# Patient Record
Sex: FEMALE | Race: WHITE | Employment: FULL TIME | URBAN - NONMETROPOLITAN AREA
[De-identification: names, ages, dates, MRNs, and addresses within clinical notes are randomized per-mention and may not be internally consistent; named-entity substitution may affect disease eponyms.]

---

## 2024-04-06 ENCOUNTER — HOSPITAL ENCOUNTER (EMERGENCY)
Age: 39
Discharge: HOME | End: 2024-04-06
Attending: EMERGENCY MEDICINE
Payer: COMMERCIAL

## 2024-04-06 VITALS
TEMPERATURE: 98.6 F | SYSTOLIC BLOOD PRESSURE: 135 MMHG | OXYGEN SATURATION: 100 % | BODY MASS INDEX: 30.43 KG/M2 | DIASTOLIC BLOOD PRESSURE: 74 MMHG | WEIGHT: 212.6 LBS | HEART RATE: 72 BPM | RESPIRATION RATE: 16 BRPM | HEIGHT: 70 IN

## 2024-04-06 DIAGNOSIS — R11.2 NAUSEA AND VOMITING, UNSPECIFIED VOMITING TYPE: Primary | ICD-10-CM

## 2024-04-06 LAB
ALBUMIN SERPL-MCNC: 4.4 G/DL (ref 3.4–5)
ALBUMIN/GLOB SERPL: 1.4 {RATIO} (ref 1.1–2.2)
ALP SERPL-CCNC: 100 U/L (ref 40–129)
ALT SERPL-CCNC: 18 U/L (ref 10–40)
ANION GAP SERPL CALCULATED.3IONS-SCNC: 13 MMOL/L (ref 3–16)
AST SERPL-CCNC: 24 U/L (ref 15–37)
BASOPHILS # BLD: 0 K/UL (ref 0–0.2)
BASOPHILS NFR BLD: 0.1 %
BILIRUB SERPL-MCNC: 0.7 MG/DL (ref 0–1)
BILIRUB UR QL STRIP.AUTO: NEGATIVE
BUN SERPL-MCNC: 16 MG/DL (ref 7–20)
CALCIUM SERPL-MCNC: 9.7 MG/DL (ref 8.3–10.6)
CHLORIDE SERPL-SCNC: 105 MMOL/L (ref 99–110)
CLARITY UR: CLEAR
CO2 SERPL-SCNC: 23 MMOL/L (ref 21–32)
COLOR UR: YELLOW
CREAT SERPL-MCNC: 0.6 MG/DL (ref 0.6–1.1)
DEPRECATED RDW RBC AUTO: 13 % (ref 12.4–15.4)
EOSINOPHIL # BLD: 0 K/UL (ref 0–0.6)
EOSINOPHIL NFR BLD: 0 %
EPI CELLS #/AREA URNS HPF: ABNORMAL /HPF (ref 0–5)
GFR SERPLBLD CREATININE-BSD FMLA CKD-EPI: >90 ML/MIN/{1.73_M2}
GLUCOSE SERPL-MCNC: 123 MG/DL (ref 70–99)
GLUCOSE UR STRIP.AUTO-MCNC: NEGATIVE MG/DL
HCT VFR BLD AUTO: 42.3 % (ref 36–48)
HGB BLD-MCNC: 14.4 G/DL (ref 12–16)
HGB UR QL STRIP.AUTO: NEGATIVE
KETONES UR STRIP.AUTO-MCNC: 40 MG/DL
LEUKOCYTE ESTERASE UR QL STRIP.AUTO: NEGATIVE
LIPASE SERPL-CCNC: 28 U/L (ref 13–60)
LYMPHOCYTES # BLD: 0.4 K/UL (ref 1–5.1)
LYMPHOCYTES NFR BLD: 4.2 %
MCH RBC QN AUTO: 29.1 PG (ref 26–34)
MCHC RBC AUTO-ENTMCNC: 34 G/DL (ref 31–36)
MCV RBC AUTO: 85.7 FL (ref 80–100)
MONOCYTES # BLD: 0.2 K/UL (ref 0–1.3)
MONOCYTES NFR BLD: 2.7 %
MUCOUS THREADS #/AREA URNS LPF: ABNORMAL /LPF
NEUTROPHILS # BLD: 8.4 K/UL (ref 1.7–7.7)
NEUTROPHILS NFR BLD: 93 %
NITRITE UR QL STRIP.AUTO: NEGATIVE
PH UR STRIP.AUTO: 7.5 [PH] (ref 5–8)
PLATELET # BLD AUTO: 230 K/UL (ref 135–450)
PMV BLD AUTO: 8.2 FL (ref 5–10.5)
POTASSIUM SERPL-SCNC: 4.5 MMOL/L (ref 3.5–5.1)
PROT SERPL-MCNC: 7.6 G/DL (ref 6.4–8.2)
PROT UR STRIP.AUTO-MCNC: ABNORMAL MG/DL
RBC # BLD AUTO: 4.94 M/UL (ref 4–5.2)
RBC #/AREA URNS HPF: ABNORMAL /HPF (ref 0–4)
SODIUM SERPL-SCNC: 141 MMOL/L (ref 136–145)
SP GR UR STRIP.AUTO: 1.02 (ref 1–1.03)
UA COMPLETE W REFLEX CULTURE PNL UR: ABNORMAL
UA DIPSTICK W REFLEX MICRO PNL UR: YES
URN SPEC COLLECT METH UR: ABNORMAL
UROBILINOGEN UR STRIP-ACNC: 0.2 E.U./DL
WBC # BLD AUTO: 9.1 K/UL (ref 4–11)
WBC #/AREA URNS HPF: ABNORMAL /HPF (ref 0–5)

## 2024-04-06 PROCEDURE — 99284 EMERGENCY DEPT VISIT MOD MDM: CPT

## 2024-04-06 PROCEDURE — 2580000003 HC RX 258: Performed by: EMERGENCY MEDICINE

## 2024-04-06 PROCEDURE — 81001 URINALYSIS AUTO W/SCOPE: CPT

## 2024-04-06 PROCEDURE — 83690 ASSAY OF LIPASE: CPT

## 2024-04-06 PROCEDURE — 36415 COLL VENOUS BLD VENIPUNCTURE: CPT

## 2024-04-06 PROCEDURE — 96375 TX/PRO/DX INJ NEW DRUG ADDON: CPT

## 2024-04-06 PROCEDURE — 2500000003 HC RX 250 WO HCPCS: Performed by: EMERGENCY MEDICINE

## 2024-04-06 PROCEDURE — 96374 THER/PROPH/DIAG INJ IV PUSH: CPT

## 2024-04-06 PROCEDURE — 80053 COMPREHEN METABOLIC PANEL: CPT

## 2024-04-06 PROCEDURE — 85025 COMPLETE CBC W/AUTO DIFF WBC: CPT

## 2024-04-06 PROCEDURE — 6360000002 HC RX W HCPCS: Performed by: EMERGENCY MEDICINE

## 2024-04-06 RX ORDER — 0.9 % SODIUM CHLORIDE 0.9 %
1000 INTRAVENOUS SOLUTION INTRAVENOUS ONCE
Status: COMPLETED | OUTPATIENT
Start: 2024-04-06 | End: 2024-04-06

## 2024-04-06 RX ORDER — ONDANSETRON 2 MG/ML
4 INJECTION INTRAMUSCULAR; INTRAVENOUS ONCE
Status: COMPLETED | OUTPATIENT
Start: 2024-04-06 | End: 2024-04-06

## 2024-04-06 RX ORDER — FAMOTIDINE 10 MG/ML
20 INJECTION, SOLUTION INTRAVENOUS ONCE
Status: COMPLETED | OUTPATIENT
Start: 2024-04-06 | End: 2024-04-06

## 2024-04-06 RX ORDER — ONDANSETRON 4 MG/1
4 TABLET, ORALLY DISINTEGRATING ORAL 3 TIMES DAILY PRN
Qty: 21 TABLET | Refills: 0 | Status: SHIPPED | OUTPATIENT
Start: 2024-04-06

## 2024-04-06 RX ADMIN — ONDANSETRON 4 MG: 2 INJECTION INTRAMUSCULAR; INTRAVENOUS at 18:07

## 2024-04-06 RX ADMIN — SODIUM CHLORIDE 1000 ML: 9 INJECTION, SOLUTION INTRAVENOUS at 18:06

## 2024-04-06 RX ADMIN — FAMOTIDINE 20 MG: 10 INJECTION, SOLUTION INTRAVENOUS at 18:07

## 2024-04-06 ASSESSMENT — LIFESTYLE VARIABLES
HOW OFTEN DO YOU HAVE A DRINK CONTAINING ALCOHOL: NEVER
HOW MANY STANDARD DRINKS CONTAINING ALCOHOL DO YOU HAVE ON A TYPICAL DAY: PATIENT DOES NOT DRINK

## 2024-04-06 ASSESSMENT — PAIN - FUNCTIONAL ASSESSMENT: PAIN_FUNCTIONAL_ASSESSMENT: NONE - DENIES PAIN

## 2024-04-06 NOTE — ED PROVIDER NOTES
DISPOSITION Discharge - Pending Orders Complete 04/06/2024 06:49:46 PM      PATIENT REFERRED TO:  Nuria Acevedo PA-C  [address] [place], [place] [id number] [phone]    Schedule an appointment as soon as possible for a visit   If symptoms worsen      DISCHARGE MEDICATIONS:  New Prescriptions    ONDANSETRON (ZOFRAN-ODT) 4 MG DISINTEGRATING TABLET    Take 1 tablet by mouth 3 times daily as needed for Nausea or Vomiting       DISCONTINUED MEDICATIONS:  Discontinued Medications    No medications on file              (Please note that portions of this note were completed with a voice recognition program.  Efforts were made to edit the dictations but occasionally words are mis-transcribed.)    Sofi Pablo MD (electronically signed)            Sofi Pablo MD  04/06/24 2274

## 2024-09-23 ENCOUNTER — HOSPITAL ENCOUNTER (EMERGENCY)
Age: 39
Discharge: LEFT BEFORE DISCHARGE | End: 2024-09-23
Attending: EMERGENCY MEDICINE
Payer: COMMERCIAL

## 2024-09-23 ENCOUNTER — APPOINTMENT (OUTPATIENT)
Dept: GENERAL RADIOLOGY | Age: 39
End: 2024-09-23
Payer: COMMERCIAL

## 2024-09-23 ENCOUNTER — APPOINTMENT (OUTPATIENT)
Dept: CT IMAGING | Age: 39
End: 2024-09-23
Payer: COMMERCIAL

## 2024-09-23 VITALS
OXYGEN SATURATION: 100 % | BODY MASS INDEX: 31.41 KG/M2 | HEIGHT: 70 IN | DIASTOLIC BLOOD PRESSURE: 109 MMHG | WEIGHT: 219.4 LBS | SYSTOLIC BLOOD PRESSURE: 156 MMHG | TEMPERATURE: 97.7 F | RESPIRATION RATE: 16 BRPM | HEART RATE: 63 BPM

## 2024-09-23 DIAGNOSIS — R07.9 CHEST PAIN, UNSPECIFIED TYPE: Primary | ICD-10-CM

## 2024-09-23 DIAGNOSIS — R10.84 GENERALIZED ABDOMINAL PAIN: ICD-10-CM

## 2024-09-23 DIAGNOSIS — R11.2 NAUSEA AND VOMITING, UNSPECIFIED VOMITING TYPE: ICD-10-CM

## 2024-09-23 LAB
ALBUMIN SERPL-MCNC: 4.4 G/DL (ref 3.4–5)
ALBUMIN/GLOB SERPL: 1.3 {RATIO} (ref 1.1–2.2)
ALP SERPL-CCNC: 135 U/L (ref 40–129)
ALT SERPL-CCNC: 39 U/L (ref 10–40)
AMORPH SED URNS QL MICRO: ABNORMAL /HPF
ANION GAP SERPL CALCULATED.3IONS-SCNC: 12 MMOL/L (ref 3–16)
AST SERPL-CCNC: 32 U/L (ref 15–37)
BACTERIA URNS QL MICRO: ABNORMAL /HPF
BASOPHILS # BLD: 0 K/UL (ref 0–0.2)
BASOPHILS NFR BLD: 0.2 %
BILIRUB SERPL-MCNC: 0.5 MG/DL (ref 0–1)
BILIRUB UR QL STRIP.AUTO: NEGATIVE
BUN SERPL-MCNC: 14 MG/DL (ref 7–20)
CALCIUM SERPL-MCNC: 9.8 MG/DL (ref 8.3–10.6)
CHLORIDE SERPL-SCNC: 105 MMOL/L (ref 99–110)
CLARITY UR: ABNORMAL
CO2 SERPL-SCNC: 24 MMOL/L (ref 21–32)
COLOR UR: YELLOW
CREAT SERPL-MCNC: 0.6 MG/DL (ref 0.6–1.1)
DEPRECATED RDW RBC AUTO: 13 % (ref 12.4–15.4)
EKG DIAGNOSIS: NORMAL
EKG Q-T INTERVAL: 480 MS
EKG QRS DURATION: 88 MS
EKG QTC CALCULATION (BAZETT): 405 MS
EKG R AXIS: 31 DEGREES
EKG T AXIS: 30 DEGREES
EKG VENTRICULAR RATE: 43 BPM
EOSINOPHIL # BLD: 0 K/UL (ref 0–0.6)
EOSINOPHIL NFR BLD: 0.1 %
EPI CELLS #/AREA URNS HPF: ABNORMAL /HPF (ref 0–5)
GFR SERPLBLD CREATININE-BSD FMLA CKD-EPI: >90 ML/MIN/{1.73_M2}
GLUCOSE SERPL-MCNC: 146 MG/DL (ref 70–99)
GLUCOSE UR STRIP.AUTO-MCNC: NEGATIVE MG/DL
HCT VFR BLD AUTO: 43.8 % (ref 36–48)
HGB BLD-MCNC: 14.5 G/DL (ref 12–16)
HGB UR QL STRIP.AUTO: NEGATIVE
KETONES UR STRIP.AUTO-MCNC: ABNORMAL MG/DL
LEUKOCYTE ESTERASE UR QL STRIP.AUTO: NEGATIVE
LYMPHOCYTES # BLD: 0.5 K/UL (ref 1–5.1)
LYMPHOCYTES NFR BLD: 5.7 %
MCH RBC QN AUTO: 29.8 PG (ref 26–34)
MCHC RBC AUTO-ENTMCNC: 33.2 G/DL (ref 31–36)
MCV RBC AUTO: 89.7 FL (ref 80–100)
MONOCYTES # BLD: 0.4 K/UL (ref 0–1.3)
MONOCYTES NFR BLD: 4 %
MUCOUS THREADS #/AREA URNS LPF: ABNORMAL /LPF
NEUTROPHILS # BLD: 8 K/UL (ref 1.7–7.7)
NEUTROPHILS NFR BLD: 90 %
NITRITE UR QL STRIP.AUTO: NEGATIVE
PH UR STRIP.AUTO: 8.5 [PH] (ref 5–8)
PLATELET # BLD AUTO: 259 K/UL (ref 135–450)
PMV BLD AUTO: 8.3 FL (ref 5–10.5)
POTASSIUM SERPL-SCNC: 3.7 MMOL/L (ref 3.5–5.1)
PROT SERPL-MCNC: 7.9 G/DL (ref 6.4–8.2)
PROT UR STRIP.AUTO-MCNC: ABNORMAL MG/DL
RBC # BLD AUTO: 4.88 M/UL (ref 4–5.2)
RBC #/AREA URNS HPF: ABNORMAL /HPF (ref 0–4)
SARS-COV-2 RDRP RESP QL NAA+PROBE: NOT DETECTED
SODIUM SERPL-SCNC: 141 MMOL/L (ref 136–145)
SP GR UR STRIP.AUTO: 1.02 (ref 1–1.03)
TROPONIN, HIGH SENSITIVITY: 11 NG/L (ref 0–14)
UA COMPLETE W REFLEX CULTURE PNL UR: ABNORMAL
UA DIPSTICK W REFLEX MICRO PNL UR: YES
URN SPEC COLLECT METH UR: ABNORMAL
UROBILINOGEN UR STRIP-ACNC: 1 E.U./DL
WBC # BLD AUTO: 8.9 K/UL (ref 4–11)
WBC #/AREA URNS HPF: ABNORMAL /HPF (ref 0–5)

## 2024-09-23 PROCEDURE — 96374 THER/PROPH/DIAG INJ IV PUSH: CPT

## 2024-09-23 PROCEDURE — 93005 ELECTROCARDIOGRAM TRACING: CPT | Performed by: EMERGENCY MEDICINE

## 2024-09-23 PROCEDURE — 6360000004 HC RX CONTRAST MEDICATION: Performed by: EMERGENCY MEDICINE

## 2024-09-23 PROCEDURE — 99285 EMERGENCY DEPT VISIT HI MDM: CPT

## 2024-09-23 PROCEDURE — 87635 SARS-COV-2 COVID-19 AMP PRB: CPT

## 2024-09-23 PROCEDURE — 74177 CT ABD & PELVIS W/CONTRAST: CPT

## 2024-09-23 PROCEDURE — 93010 ELECTROCARDIOGRAM REPORT: CPT | Performed by: INTERNAL MEDICINE

## 2024-09-23 PROCEDURE — 81001 URINALYSIS AUTO W/SCOPE: CPT

## 2024-09-23 PROCEDURE — 85025 COMPLETE CBC W/AUTO DIFF WBC: CPT

## 2024-09-23 PROCEDURE — 36415 COLL VENOUS BLD VENIPUNCTURE: CPT

## 2024-09-23 PROCEDURE — 2580000003 HC RX 258: Performed by: EMERGENCY MEDICINE

## 2024-09-23 PROCEDURE — 80053 COMPREHEN METABOLIC PANEL: CPT

## 2024-09-23 PROCEDURE — 84484 ASSAY OF TROPONIN QUANT: CPT

## 2024-09-23 PROCEDURE — 6360000002 HC RX W HCPCS: Performed by: EMERGENCY MEDICINE

## 2024-09-23 RX ORDER — LISINOPRIL 10 MG/1
10 TABLET ORAL DAILY
COMMUNITY

## 2024-09-23 RX ORDER — 0.9 % SODIUM CHLORIDE 0.9 %
1000 INTRAVENOUS SOLUTION INTRAVENOUS ONCE
Status: COMPLETED | OUTPATIENT
Start: 2024-09-23 | End: 2024-09-23

## 2024-09-23 RX ORDER — IOPAMIDOL 755 MG/ML
75 INJECTION, SOLUTION INTRAVASCULAR
Status: COMPLETED | OUTPATIENT
Start: 2024-09-23 | End: 2024-09-23

## 2024-09-23 RX ORDER — METOPROLOL SUCCINATE 100 MG/1
TABLET, EXTENDED RELEASE ORAL
COMMUNITY

## 2024-09-23 RX ORDER — GABAPENTIN 300 MG/1
300 CAPSULE ORAL 3 TIMES DAILY
COMMUNITY

## 2024-09-23 RX ORDER — ONDANSETRON 2 MG/ML
4 INJECTION INTRAMUSCULAR; INTRAVENOUS ONCE
Status: COMPLETED | OUTPATIENT
Start: 2024-09-23 | End: 2024-09-23

## 2024-09-23 RX ORDER — VALACYCLOVIR HYDROCHLORIDE 500 MG/1
500 TABLET, FILM COATED ORAL DAILY
COMMUNITY

## 2024-09-23 RX ADMIN — IOPAMIDOL 75 ML: 755 INJECTION, SOLUTION INTRAVENOUS at 12:00

## 2024-09-23 RX ADMIN — ONDANSETRON 4 MG: 2 INJECTION INTRAMUSCULAR; INTRAVENOUS at 11:16

## 2024-09-23 RX ADMIN — SODIUM CHLORIDE 1000 ML: 9 INJECTION, SOLUTION INTRAVENOUS at 11:15

## 2024-09-23 ASSESSMENT — PAIN - FUNCTIONAL ASSESSMENT: PAIN_FUNCTIONAL_ASSESSMENT: NONE - DENIES PAIN

## 2024-09-23 ASSESSMENT — ENCOUNTER SYMPTOMS
TROUBLE SWALLOWING: 0
STRIDOR: 0
SINUS PRESSURE: 1
VOICE CHANGE: 0
ABDOMINAL PAIN: 1
WHEEZING: 0
SHORTNESS OF BREATH: 1
COLOR CHANGE: 0
NAUSEA: 1
VOMITING: 1
FACIAL SWELLING: 0

## 2025-03-29 ENCOUNTER — APPOINTMENT (OUTPATIENT)
Dept: GENERAL RADIOLOGY | Age: 40
End: 2025-03-29
Payer: COMMERCIAL

## 2025-03-29 ENCOUNTER — HOSPITAL ENCOUNTER (EMERGENCY)
Age: 40
Discharge: HOME | End: 2025-03-29
Attending: EMERGENCY MEDICINE
Payer: COMMERCIAL

## 2025-03-29 VITALS
OXYGEN SATURATION: 100 % | DIASTOLIC BLOOD PRESSURE: 78 MMHG | TEMPERATURE: 98.3 F | SYSTOLIC BLOOD PRESSURE: 120 MMHG | HEIGHT: 70 IN | BODY MASS INDEX: 32.07 KG/M2 | WEIGHT: 224 LBS | HEART RATE: 81 BPM | RESPIRATION RATE: 16 BRPM

## 2025-03-29 DIAGNOSIS — L08.9 TRAUMATIC OPEN WOUND OF LOWER LEG WITH INFECTION, LEFT, INITIAL ENCOUNTER: Primary | ICD-10-CM

## 2025-03-29 DIAGNOSIS — S81.802A TRAUMATIC OPEN WOUND OF LOWER LEG WITH INFECTION, LEFT, INITIAL ENCOUNTER: Primary | ICD-10-CM

## 2025-03-29 PROCEDURE — 73590 X-RAY EXAM OF LOWER LEG: CPT

## 2025-03-29 PROCEDURE — 90715 TDAP VACCINE 7 YRS/> IM: CPT | Performed by: EMERGENCY MEDICINE

## 2025-03-29 PROCEDURE — 90471 IMMUNIZATION ADMIN: CPT | Performed by: EMERGENCY MEDICINE

## 2025-03-29 PROCEDURE — 99284 EMERGENCY DEPT VISIT MOD MDM: CPT

## 2025-03-29 PROCEDURE — 6360000002 HC RX W HCPCS: Performed by: EMERGENCY MEDICINE

## 2025-03-29 PROCEDURE — 6370000000 HC RX 637 (ALT 250 FOR IP): Performed by: EMERGENCY MEDICINE

## 2025-03-29 RX ORDER — IBUPROFEN 400 MG/1
400 TABLET, FILM COATED ORAL ONCE
Status: COMPLETED | OUTPATIENT
Start: 2025-03-29 | End: 2025-03-29

## 2025-03-29 RX ORDER — CEPHALEXIN 500 MG/1
500 CAPSULE ORAL 2 TIMES DAILY
Qty: 14 CAPSULE | Refills: 0 | Status: SHIPPED | OUTPATIENT
Start: 2025-03-29 | End: 2025-04-05

## 2025-03-29 RX ADMIN — CEPHALEXIN 250 MG: 250 CAPSULE ORAL at 17:37

## 2025-03-29 RX ADMIN — IBUPROFEN 400 MG: 400 TABLET, FILM COATED ORAL at 17:37

## 2025-03-29 RX ADMIN — TETANUS TOXOID, REDUCED DIPHTHERIA TOXOID AND ACELLULAR PERTUSSIS VACCINE, ADSORBED 0.5 ML: 5; 2.5; 8; 8; 2.5 SUSPENSION INTRAMUSCULAR at 17:38

## 2025-03-29 ASSESSMENT — PAIN DESCRIPTION - LOCATION: LOCATION: LEG

## 2025-03-29 ASSESSMENT — PAIN - FUNCTIONAL ASSESSMENT: PAIN_FUNCTIONAL_ASSESSMENT: 0-10

## 2025-03-29 ASSESSMENT — LIFESTYLE VARIABLES
HOW MANY STANDARD DRINKS CONTAINING ALCOHOL DO YOU HAVE ON A TYPICAL DAY: PATIENT DOES NOT DRINK
HOW OFTEN DO YOU HAVE A DRINK CONTAINING ALCOHOL: NEVER

## 2025-03-29 ASSESSMENT — PAIN DESCRIPTION - ORIENTATION: ORIENTATION: LEFT

## 2025-03-29 ASSESSMENT — PAIN SCALES - GENERAL: PAINLEVEL_OUTOF10: 3

## 2025-03-29 NOTE — ED PROVIDER NOTES
[facility] EMERGENCY DEPARTMENT     EMERGENCY DEPARTMENT ENCOUNTER            Pt Name: Milagro Lee   MRN: [id number]   Birthdate 1985   Date of evaluation: 3/29/2025   Provider: CODY VYAS MD   PCP: Kat Chavez MD   Note Started: 5:07 PM EDT 3/29/25          CHIEF COMPLAINT     Chief Complaint   Patient presents with    Leg Pain     Pt states that she feel into some wooden stairs a few days ago. Pt states that she had a small wound on her left lower leg and states that she cleaned it with peroxide. Pt states that today the wound is more painful and has more redness and drainage with it. Pt unsure of tetanus.             HISTORY OF PRESENT ILLNESS:   History from : Patient   Limitations to history : None     Milagro Lee is a 40 y.o. female with h/o HTN, Hep C, HL,who presents with c/o redness and swelling to left shin after she scraped it going down some outdoor wooden stairs 4 days ago. She states she has been cleaning it at home and applying bacitracin to it but it got worse yesterday after she started washing it with hydrogen peroxide.     Denies fever or N/V.      Nursing Notes were all reviewed and agreed with, or any disagreements were addressed in the HPI.     REVIEW OF SYSTEMS :    Positives and Pertinent negatives as per HPI.      MEDICAL HISTORY   has a past medical history of Anxiety, Hepatitis C antibody positive in blood (12/04/2017), Hyperlipidemia, Hypertension, Kidney stone, Migraine, Migraine, and MRSA (methicillin resistant staph aureus) culture positive (3/1/11).    Past Surgical History:   Procedure Laterality Date    BLADDER SURGERY      CYSTOSCOPY Left 08/10/2016    , left stone manipulation and stent placement 6 x 24    HYSTERECTOMY (CERVIX STATUS UNKNOWN)        CURRENTMEDICATIONS       Previous Medications    GABAPENTIN (NEURONTIN) 300 MG CAPSULE    Take 1 capsule by mouth 3 times daily.    IBUPROFEN (ADVIL;MOTRIN) 600 MG TABLET    Take 600 mg by mouth every 6  hours as needed for Pain    IUD'S IU    1 Device by Intrauterine route    LISINOPRIL (PRINIVIL;ZESTRIL) 10 MG TABLET    Take 1 tablet by mouth daily    METOPROLOL SUCCINATE (TOPROL XL) 100 MG EXTENDED RELEASE TABLET    metoprolol succinate  mg tablet,extended release 24 hr   TAKE ONE TABLET BY MOUTH DAILY    MINOCYCLINE (DYNACIN) 100 MG TABLET    Take 1 tablet by mouth 2 times daily    NAPROXEN (NAPROSYN) 500 MG TABLET    Take 1 tablet by mouth 2 times daily for 5 days    ONDANSETRON (ZOFRAN-ODT) 4 MG DISINTEGRATING TABLET    Take 1 tablet by mouth 3 times daily as needed for Nausea or Vomiting    POTASSIUM CHLORIDE (KLOR-CON M) 20 MEQ EXTENDED RELEASE TABLET    Take 1 tablet by mouth daily for 10 days    PRAMIPEXOLE (MIRAPEX) 0.125 MG TABLET    Take 1 tablet by mouth 3 times daily    VALACYCLOVIR (VALTREX) 500 MG TABLET    Take 1 tablet by mouth daily      SCREENINGS          [place] Coma Scale  Eye Opening: Spontaneous  Best Verbal Response: Oriented  Best Motor Response: Obeys commands  [place] Coma Scale Score: 15                CIWA Assessment  BP: 120/78  Pulse: 81                  PHYSICAL EXAM :  ED Triage Vitals   BP Systolic BP Percentile Diastolic BP Percentile Temp Temp src Pulse Resp SpO2   -- -- -- -- -- -- -- --      Height Weight         -- --            GENERAL APPEARANCE: Awake and alert. Cooperative. No acute distress.  HEAD: Normocephalic. Atraumatic.  EYES: PERRL. EOM's grossly intact.   ENT: Mucous membranes are moist.   NECK: Supple, trachea midline.   HEART: RRR. Normal S1, S2. No murmurs, rubs or gallops.   LUNGS: Respirations unlabored. CTAB. Good air exchange. No wheezes, rales, or rhonchi.  Speaking comfortably in full sentences.   ABDOMEN: Soft. Non-distended. Non-tender. No guarding or rebound. Normal Bowel sounds.  EXTREMITIES: No peripheral edema. MAEE. No acute deformities.  SKIN: Warm and dry. Left distal anterior tibia with 5 x 10 area of irregular appearing abrasions with

## 2025-05-30 ENCOUNTER — HOSPITAL ENCOUNTER (INPATIENT)
Age: 40
LOS: 1 days | Discharge: HOME | DRG: 916 | End: 2025-06-01
Attending: EMERGENCY MEDICINE | Admitting: STUDENT IN AN ORGANIZED HEALTH CARE EDUCATION/TRAINING PROGRAM
Payer: COMMERCIAL

## 2025-05-30 ENCOUNTER — APPOINTMENT (OUTPATIENT)
Dept: GENERAL RADIOLOGY | Age: 40
DRG: 916 | End: 2025-05-30
Payer: COMMERCIAL

## 2025-05-30 DIAGNOSIS — R19.7 NAUSEA VOMITING AND DIARRHEA: ICD-10-CM

## 2025-05-30 DIAGNOSIS — R11.2 NAUSEA VOMITING AND DIARRHEA: ICD-10-CM

## 2025-05-30 DIAGNOSIS — R07.89 ATYPICAL CHEST PAIN: Primary | ICD-10-CM

## 2025-05-30 DIAGNOSIS — R79.89 ELEVATED TROPONIN: ICD-10-CM

## 2025-05-30 LAB
ALBUMIN SERPL-MCNC: 3.6 G/DL (ref 3.4–5)
ALBUMIN/GLOB SERPL: 1.6 {RATIO} (ref 1.1–2.2)
ALP SERPL-CCNC: 97 U/L (ref 40–129)
ALT SERPL-CCNC: 7 U/L (ref 10–40)
AMPHETAMINES UR QL SCN>1000 NG/ML: ABNORMAL
ANION GAP SERPL CALCULATED.3IONS-SCNC: 14 MMOL/L (ref 3–16)
AST SERPL-CCNC: 24 U/L (ref 15–37)
BARBITURATES UR QL SCN>200 NG/ML: ABNORMAL
BASOPHILS # BLD: 0.1 K/UL (ref 0–0.2)
BASOPHILS NFR BLD: 0.4 %
BENZODIAZ UR QL SCN>200 NG/ML: ABNORMAL
BILIRUB SERPL-MCNC: 0.4 MG/DL (ref 0–1)
BUN SERPL-MCNC: 14 MG/DL (ref 7–20)
CALCIUM SERPL-MCNC: 8.2 MG/DL (ref 8.3–10.6)
CANNABINOIDS UR QL SCN>50 NG/ML: POSITIVE
CHLORIDE SERPL-SCNC: 106 MMOL/L (ref 99–110)
CO2 SERPL-SCNC: 22 MMOL/L (ref 21–32)
COCAINE UR QL SCN: ABNORMAL
CREAT SERPL-MCNC: 0.8 MG/DL (ref 0.6–1.1)
DEPRECATED RDW RBC AUTO: 12.7 % (ref 12.4–15.4)
DRUG SCREEN COMMENT UR-IMP: ABNORMAL
EOSINOPHIL # BLD: 0.2 K/UL (ref 0–0.6)
EOSINOPHIL NFR BLD: 1.6 %
FENTANYL SCREEN, URINE: POSITIVE
GFR SERPLBLD CREATININE-BSD FMLA CKD-EPI: >90 ML/MIN/{1.73_M2}
GLUCOSE SERPL-MCNC: 117 MG/DL (ref 70–99)
HCT VFR BLD AUTO: 52.7 % (ref 36–48)
HGB BLD-MCNC: 17.4 G/DL (ref 12–16)
LYMPHOCYTES # BLD: 2.1 K/UL (ref 1–5.1)
LYMPHOCYTES NFR BLD: 13.8 %
MAGNESIUM SERPL-MCNC: 1.91 MG/DL (ref 1.8–2.4)
MCH RBC QN AUTO: 29.5 PG (ref 26–34)
MCHC RBC AUTO-ENTMCNC: 32.9 G/DL (ref 31–36)
MCV RBC AUTO: 89.6 FL (ref 80–100)
METHADONE UR QL SCN>300 NG/ML: ABNORMAL
MONOCYTES # BLD: 1 K/UL (ref 0–1.3)
MONOCYTES NFR BLD: 6.4 %
NEUTROPHILS # BLD: 11.9 K/UL (ref 1.7–7.7)
NEUTROPHILS NFR BLD: 77.8 %
NT-PROBNP SERPL-MCNC: 262 PG/ML (ref 0–124)
OPIATES UR QL SCN>300 NG/ML: ABNORMAL
OXYCODONE UR QL SCN: ABNORMAL
PCP UR QL SCN>25 NG/ML: ABNORMAL
PH UR STRIP: 5 [PH]
PLATELET # BLD AUTO: 350 K/UL (ref 135–450)
PMV BLD AUTO: 9.1 FL (ref 5–10.5)
POTASSIUM SERPL-SCNC: 3.5 MMOL/L (ref 3.5–5.1)
PROT SERPL-MCNC: 5.9 G/DL (ref 6.4–8.2)
RBC # BLD AUTO: 5.88 M/UL (ref 4–5.2)
SODIUM SERPL-SCNC: 142 MMOL/L (ref 136–145)
TROPONIN, HIGH SENSITIVITY: 14 NG/L (ref 0–14)
TROPONIN, HIGH SENSITIVITY: 43 NG/L (ref 0–14)
WBC # BLD AUTO: 15.4 K/UL (ref 4–11)

## 2025-05-30 PROCEDURE — 96361 HYDRATE IV INFUSION ADD-ON: CPT

## 2025-05-30 PROCEDURE — 80307 DRUG TEST PRSMV CHEM ANLYZR: CPT

## 2025-05-30 PROCEDURE — 80053 COMPREHEN METABOLIC PANEL: CPT

## 2025-05-30 PROCEDURE — 84484 ASSAY OF TROPONIN QUANT: CPT

## 2025-05-30 PROCEDURE — 93005 ELECTROCARDIOGRAM TRACING: CPT | Performed by: EMERGENCY MEDICINE

## 2025-05-30 PROCEDURE — 83880 ASSAY OF NATRIURETIC PEPTIDE: CPT

## 2025-05-30 PROCEDURE — 99285 EMERGENCY DEPT VISIT HI MDM: CPT

## 2025-05-30 PROCEDURE — 2580000003 HC RX 258

## 2025-05-30 PROCEDURE — 83735 ASSAY OF MAGNESIUM: CPT

## 2025-05-30 PROCEDURE — 71046 X-RAY EXAM CHEST 2 VIEWS: CPT

## 2025-05-30 PROCEDURE — 6370000000 HC RX 637 (ALT 250 FOR IP)

## 2025-05-30 PROCEDURE — 85025 COMPLETE CBC W/AUTO DIFF WBC: CPT

## 2025-05-30 PROCEDURE — 96360 HYDRATION IV INFUSION INIT: CPT

## 2025-05-30 RX ORDER — ARIPIPRAZOLE 15 MG/1
15 TABLET ORAL DAILY
COMMUNITY

## 2025-05-30 RX ORDER — DICYCLOMINE HCL 20 MG
20 TABLET ORAL ONCE
Status: COMPLETED | OUTPATIENT
Start: 2025-05-30 | End: 2025-05-30

## 2025-05-30 RX ORDER — DIPHENHYDRAMINE HCL 25 MG
25 TABLET ORAL ONCE
Status: COMPLETED | OUTPATIENT
Start: 2025-05-30 | End: 2025-05-30

## 2025-05-30 RX ORDER — 0.9 % SODIUM CHLORIDE 0.9 %
1000 INTRAVENOUS SOLUTION INTRAVENOUS ONCE
Status: COMPLETED | OUTPATIENT
Start: 2025-05-30 | End: 2025-05-30

## 2025-05-30 RX ADMIN — DICYCLOMINE HYDROCHLORIDE 20 MG: 20 TABLET ORAL at 21:53

## 2025-05-30 RX ADMIN — SODIUM CHLORIDE 1000 ML: 0.9 INJECTION, SOLUTION INTRAVENOUS at 22:03

## 2025-05-30 RX ADMIN — DIPHENHYDRAMINE HCL 25 MG: 25 TABLET ORAL at 21:53

## 2025-05-30 ASSESSMENT — PAIN DESCRIPTION - LOCATION
LOCATION: CHEST
LOCATION: ABDOMEN

## 2025-05-30 ASSESSMENT — PAIN DESCRIPTION - DESCRIPTORS
DESCRIPTORS: ACHING;CRAMPING
DESCRIPTORS: PRESSURE

## 2025-05-30 ASSESSMENT — PAIN DESCRIPTION - ONSET: ONSET: SUDDEN

## 2025-05-30 ASSESSMENT — PAIN SCALES - GENERAL
PAINLEVEL_OUTOF10: 7
PAINLEVEL_OUTOF10: 7

## 2025-05-30 ASSESSMENT — PAIN DESCRIPTION - FREQUENCY: FREQUENCY: CONTINUOUS

## 2025-05-30 ASSESSMENT — PAIN DESCRIPTION - ORIENTATION
ORIENTATION: RIGHT;LEFT;LOWER
ORIENTATION: MID

## 2025-05-30 ASSESSMENT — PAIN - FUNCTIONAL ASSESSMENT
PAIN_FUNCTIONAL_ASSESSMENT: 0-10
PAIN_FUNCTIONAL_ASSESSMENT: ACTIVITIES ARE NOT PREVENTED
PAIN_FUNCTIONAL_ASSESSMENT: PREVENTS OR INTERFERES SOME ACTIVE ACTIVITIES AND ADLS

## 2025-05-31 ENCOUNTER — APPOINTMENT (OUTPATIENT)
Dept: CT IMAGING | Age: 40
DRG: 916 | End: 2025-05-31
Payer: COMMERCIAL

## 2025-05-31 PROBLEM — R07.89 CHEST PRESSURE: Status: ACTIVE | Noted: 2025-05-31

## 2025-05-31 PROBLEM — K62.5 RECTAL BLEED: Status: ACTIVE | Noted: 2025-05-31

## 2025-05-31 PROBLEM — R79.89 ELEVATED TROPONIN: Status: ACTIVE | Noted: 2025-05-31

## 2025-05-31 PROBLEM — T78.40XA ALLERGIC REACTION: Status: ACTIVE | Noted: 2025-05-31

## 2025-05-31 LAB
ANION GAP SERPL CALCULATED.3IONS-SCNC: 13 MMOL/L (ref 3–16)
BASOPHILS # BLD: 0.1 K/UL (ref 0–0.2)
BASOPHILS NFR BLD: 1 %
BUN SERPL-MCNC: 14 MG/DL (ref 7–20)
CALCIUM SERPL-MCNC: 8.1 MG/DL (ref 8.3–10.6)
CHLORIDE SERPL-SCNC: 107 MMOL/L (ref 99–110)
CO2 SERPL-SCNC: 18 MMOL/L (ref 21–32)
CREAT SERPL-MCNC: 0.7 MG/DL (ref 0.6–1.1)
DEPRECATED RDW RBC AUTO: 12.7 % (ref 12.4–15.4)
EKG ATRIAL RATE: 56 BPM
EKG DIAGNOSIS: NORMAL
EKG P AXIS: 51 DEGREES
EKG P-R INTERVAL: 186 MS
EKG Q-T INTERVAL: 442 MS
EKG QRS DURATION: 94 MS
EKG QTC CALCULATION (BAZETT): 426 MS
EKG R AXIS: 22 DEGREES
EKG T AXIS: 38 DEGREES
EKG VENTRICULAR RATE: 56 BPM
EOSINOPHIL # BLD: 0 K/UL (ref 0–0.6)
EOSINOPHIL NFR BLD: 0 %
GFR SERPLBLD CREATININE-BSD FMLA CKD-EPI: >90 ML/MIN/{1.73_M2}
GLUCOSE SERPL-MCNC: 128 MG/DL (ref 70–99)
HCT VFR BLD AUTO: 43.4 % (ref 36–48)
HGB BLD-MCNC: 14.3 G/DL (ref 12–16)
LYMPHOCYTES # BLD: 0.6 K/UL (ref 1–5.1)
LYMPHOCYTES NFR BLD: 5 %
MCH RBC QN AUTO: 29.7 PG (ref 26–34)
MCHC RBC AUTO-ENTMCNC: 33 G/DL (ref 31–36)
MCV RBC AUTO: 90 FL (ref 80–100)
MONOCYTES # BLD: 0.3 K/UL (ref 0–1.3)
MONOCYTES NFR BLD: 3 %
NEUTROPHILS # BLD: 10.5 K/UL (ref 1.7–7.7)
NEUTROPHILS NFR BLD: 91 %
PLATELET # BLD AUTO: 207 K/UL (ref 135–450)
PLATELET BLD QL SMEAR: ADEQUATE
PMV BLD AUTO: 9.6 FL (ref 5–10.5)
POIKILOCYTOSIS BLD QL SMEAR: ABNORMAL
POTASSIUM SERPL-SCNC: 4.4 MMOL/L (ref 3.5–5.1)
RBC # BLD AUTO: 4.83 M/UL (ref 4–5.2)
SLIDE REVIEW: ABNORMAL
SODIUM SERPL-SCNC: 138 MMOL/L (ref 136–145)
TROPONIN, HIGH SENSITIVITY: 34 NG/L (ref 0–14)
WBC # BLD AUTO: 11.5 K/UL (ref 4–11)

## 2025-05-31 PROCEDURE — 6360000004 HC RX CONTRAST MEDICATION: Performed by: INTERNAL MEDICINE

## 2025-05-31 PROCEDURE — 6370000000 HC RX 637 (ALT 250 FOR IP): Performed by: INTERNAL MEDICINE

## 2025-05-31 PROCEDURE — 36415 COLL VENOUS BLD VENIPUNCTURE: CPT

## 2025-05-31 PROCEDURE — 6360000002 HC RX W HCPCS: Performed by: STUDENT IN AN ORGANIZED HEALTH CARE EDUCATION/TRAINING PROGRAM

## 2025-05-31 PROCEDURE — 74177 CT ABD & PELVIS W/CONTRAST: CPT

## 2025-05-31 PROCEDURE — G0378 HOSPITAL OBSERVATION PER HR: HCPCS

## 2025-05-31 PROCEDURE — 85025 COMPLETE CBC W/AUTO DIFF WBC: CPT

## 2025-05-31 PROCEDURE — 80048 BASIC METABOLIC PNL TOTAL CA: CPT

## 2025-05-31 PROCEDURE — 6360000002 HC RX W HCPCS: Performed by: INTERNAL MEDICINE

## 2025-05-31 PROCEDURE — 2580000003 HC RX 258: Performed by: STUDENT IN AN ORGANIZED HEALTH CARE EDUCATION/TRAINING PROGRAM

## 2025-05-31 PROCEDURE — 1200000000 HC SEMI PRIVATE

## 2025-05-31 PROCEDURE — 84484 ASSAY OF TROPONIN QUANT: CPT

## 2025-05-31 PROCEDURE — 96361 HYDRATE IV INFUSION ADD-ON: CPT

## 2025-05-31 PROCEDURE — 99232 SBSQ HOSP IP/OBS MODERATE 35: CPT | Performed by: INTERNAL MEDICINE

## 2025-05-31 PROCEDURE — 2500000003 HC RX 250 WO HCPCS: Performed by: INTERNAL MEDICINE

## 2025-05-31 PROCEDURE — 96375 TX/PRO/DX INJ NEW DRUG ADDON: CPT

## 2025-05-31 PROCEDURE — 93010 ELECTROCARDIOGRAM REPORT: CPT | Performed by: INTERNAL MEDICINE

## 2025-05-31 PROCEDURE — 6370000000 HC RX 637 (ALT 250 FOR IP): Performed by: STUDENT IN AN ORGANIZED HEALTH CARE EDUCATION/TRAINING PROGRAM

## 2025-05-31 PROCEDURE — 96372 THER/PROPH/DIAG INJ SC/IM: CPT

## 2025-05-31 RX ORDER — POTASSIUM CHLORIDE 1500 MG/1
40 TABLET, EXTENDED RELEASE ORAL PRN
Status: DISCONTINUED | OUTPATIENT
Start: 2025-05-31 | End: 2025-06-01 | Stop reason: HOSPADM

## 2025-05-31 RX ORDER — SODIUM CHLORIDE 0.9 % (FLUSH) 0.9 %
5-40 SYRINGE (ML) INJECTION EVERY 12 HOURS SCHEDULED
Status: DISCONTINUED | OUTPATIENT
Start: 2025-05-31 | End: 2025-06-01 | Stop reason: HOSPADM

## 2025-05-31 RX ORDER — GABAPENTIN 300 MG/1
300 CAPSULE ORAL 3 TIMES DAILY
Status: DISCONTINUED | OUTPATIENT
Start: 2025-05-31 | End: 2025-06-01 | Stop reason: HOSPADM

## 2025-05-31 RX ORDER — POTASSIUM CHLORIDE 7.45 MG/ML
10 INJECTION INTRAVENOUS PRN
Status: DISCONTINUED | OUTPATIENT
Start: 2025-05-31 | End: 2025-06-01 | Stop reason: HOSPADM

## 2025-05-31 RX ORDER — ONDANSETRON 4 MG/1
4 TABLET, ORALLY DISINTEGRATING ORAL EVERY 8 HOURS PRN
Status: DISCONTINUED | OUTPATIENT
Start: 2025-05-31 | End: 2025-06-01 | Stop reason: HOSPADM

## 2025-05-31 RX ORDER — ONDANSETRON 2 MG/ML
4 INJECTION INTRAMUSCULAR; INTRAVENOUS EVERY 6 HOURS PRN
Status: DISCONTINUED | OUTPATIENT
Start: 2025-05-31 | End: 2025-06-01 | Stop reason: HOSPADM

## 2025-05-31 RX ORDER — ENOXAPARIN SODIUM 100 MG/ML
40 INJECTION SUBCUTANEOUS DAILY
Status: DISCONTINUED | OUTPATIENT
Start: 2025-05-31 | End: 2025-06-01 | Stop reason: HOSPADM

## 2025-05-31 RX ORDER — BUPRENORPHINE HYDROCHLORIDE AND NALOXONE HYDROCHLORIDE DIHYDRATE 8; 2 MG/1; MG/1
2 TABLET SUBLINGUAL DAILY
Status: DISCONTINUED | OUTPATIENT
Start: 2025-05-31 | End: 2025-06-01 | Stop reason: HOSPADM

## 2025-05-31 RX ORDER — LISINOPRIL 10 MG/1
10 TABLET ORAL DAILY
Status: DISCONTINUED | OUTPATIENT
Start: 2025-05-31 | End: 2025-05-31

## 2025-05-31 RX ORDER — SODIUM CHLORIDE 0.9 % (FLUSH) 0.9 %
5-40 SYRINGE (ML) INJECTION PRN
Status: DISCONTINUED | OUTPATIENT
Start: 2025-05-31 | End: 2025-06-01 | Stop reason: HOSPADM

## 2025-05-31 RX ORDER — PROCHLORPERAZINE EDISYLATE 5 MG/ML
10 INJECTION INTRAMUSCULAR; INTRAVENOUS EVERY 6 HOURS PRN
Status: DISCONTINUED | OUTPATIENT
Start: 2025-05-31 | End: 2025-06-01 | Stop reason: HOSPADM

## 2025-05-31 RX ORDER — MAGNESIUM SULFATE IN WATER 40 MG/ML
2000 INJECTION, SOLUTION INTRAVENOUS PRN
Status: DISCONTINUED | OUTPATIENT
Start: 2025-05-31 | End: 2025-06-01 | Stop reason: HOSPADM

## 2025-05-31 RX ORDER — ACETAMINOPHEN 650 MG/1
650 SUPPOSITORY RECTAL EVERY 6 HOURS PRN
Status: DISCONTINUED | OUTPATIENT
Start: 2025-05-31 | End: 2025-06-01 | Stop reason: HOSPADM

## 2025-05-31 RX ORDER — ACETAMINOPHEN 325 MG/1
650 TABLET ORAL EVERY 6 HOURS PRN
Status: DISCONTINUED | OUTPATIENT
Start: 2025-05-31 | End: 2025-06-01 | Stop reason: HOSPADM

## 2025-05-31 RX ORDER — POLYETHYLENE GLYCOL 3350 17 G/17G
17 POWDER, FOR SOLUTION ORAL DAILY PRN
Status: DISCONTINUED | OUTPATIENT
Start: 2025-05-31 | End: 2025-06-01 | Stop reason: HOSPADM

## 2025-05-31 RX ORDER — SODIUM CHLORIDE 9 MG/ML
INJECTION, SOLUTION INTRAVENOUS PRN
Status: DISCONTINUED | OUTPATIENT
Start: 2025-05-31 | End: 2025-06-01 | Stop reason: HOSPADM

## 2025-05-31 RX ORDER — FAMOTIDINE 20 MG/1
20 TABLET, FILM COATED ORAL 2 TIMES DAILY
Status: DISCONTINUED | OUTPATIENT
Start: 2025-05-31 | End: 2025-06-01 | Stop reason: HOSPADM

## 2025-05-31 RX ORDER — IOPAMIDOL 755 MG/ML
75 INJECTION, SOLUTION INTRAVASCULAR
Status: COMPLETED | OUTPATIENT
Start: 2025-05-31 | End: 2025-05-31

## 2025-05-31 RX ORDER — SODIUM CHLORIDE, SODIUM LACTATE, POTASSIUM CHLORIDE, CALCIUM CHLORIDE 600; 310; 30; 20 MG/100ML; MG/100ML; MG/100ML; MG/100ML
INJECTION, SOLUTION INTRAVENOUS CONTINUOUS
Status: ACTIVE | OUTPATIENT
Start: 2025-05-31 | End: 2025-06-01

## 2025-05-31 RX ORDER — ARIPIPRAZOLE 10 MG/1
15 TABLET ORAL DAILY
Status: DISCONTINUED | OUTPATIENT
Start: 2025-05-31 | End: 2025-06-01 | Stop reason: HOSPADM

## 2025-05-31 RX ORDER — METOPROLOL SUCCINATE 50 MG/1
100 TABLET, EXTENDED RELEASE ORAL DAILY
Status: DISCONTINUED | OUTPATIENT
Start: 2025-05-31 | End: 2025-06-01 | Stop reason: HOSPADM

## 2025-05-31 RX ADMIN — GABAPENTIN 300 MG: 300 CAPSULE ORAL at 07:49

## 2025-05-31 RX ADMIN — GABAPENTIN 300 MG: 300 CAPSULE ORAL at 14:29

## 2025-05-31 RX ADMIN — WATER 40 MG: 1 INJECTION INTRAMUSCULAR; INTRAVENOUS; SUBCUTANEOUS at 12:14

## 2025-05-31 RX ADMIN — SODIUM CHLORIDE, SODIUM LACTATE, POTASSIUM CHLORIDE, AND CALCIUM CHLORIDE: .6; .31; .03; .02 INJECTION, SOLUTION INTRAVENOUS at 01:12

## 2025-05-31 RX ADMIN — ENOXAPARIN SODIUM 40 MG: 100 INJECTION SUBCUTANEOUS at 07:49

## 2025-05-31 RX ADMIN — FAMOTIDINE 20 MG: 20 TABLET, FILM COATED ORAL at 20:16

## 2025-05-31 RX ADMIN — ONDANSETRON 4 MG: 2 INJECTION, SOLUTION INTRAMUSCULAR; INTRAVENOUS at 01:48

## 2025-05-31 RX ADMIN — IOPAMIDOL 75 ML: 755 INJECTION, SOLUTION INTRAVENOUS at 11:16

## 2025-05-31 RX ADMIN — BUPRENORPHINE HYDROCHLORIDE AND NALOXONE HYDROCHLORIDE DIHYDRATE 2 TABLET: 8; 2 TABLET SUBLINGUAL at 12:24

## 2025-05-31 RX ADMIN — PROCHLORPERAZINE EDISYLATE 10 MG: 5 INJECTION INTRAMUSCULAR; INTRAVENOUS at 05:34

## 2025-05-31 RX ADMIN — LISINOPRIL 10 MG: 10 TABLET ORAL at 07:49

## 2025-05-31 RX ADMIN — ARIPIPRAZOLE 15 MG: 10 TABLET ORAL at 07:48

## 2025-05-31 RX ADMIN — FAMOTIDINE 20 MG: 20 TABLET, FILM COATED ORAL at 12:13

## 2025-05-31 RX ADMIN — SODIUM CHLORIDE, SODIUM LACTATE, POTASSIUM CHLORIDE, AND CALCIUM CHLORIDE: .6; .31; .03; .02 INJECTION, SOLUTION INTRAVENOUS at 14:26

## 2025-05-31 RX ADMIN — GABAPENTIN 300 MG: 300 CAPSULE ORAL at 20:16

## 2025-05-31 ASSESSMENT — PAIN SCALES - GENERAL: PAINLEVEL_OUTOF10: 0

## 2025-05-31 NOTE — PROGRESS NOTES
.IM Progress Note    Admit Date:  5/30/2025  0    Interval history:  developed chest pain started after she ate sauerkraut and noted that her lips became swollen, she became diaphoretic, nauseous and had vomiting and diarrhea     Subjective:  Ms. Lee feels ok in regarding to allergy , lip swelling, chest pain and sob resolved quickly with benadryl but now with left LQ abd pain and blood in stools x 4 overnight  No hx of hemorrhoids      Objective:   BP (!) 151/94   Pulse 56   Temp 97.7 °F (36.5 °C) (Oral)   Resp 16   Ht 1.778 m (5' 10\")   Wt 100.7 kg (222 lb)   LMP  (LMP Unknown)   SpO2 100%   BMI 31.85 kg/m²     Intake/Output Summary (Last 24 hours) at 5/31/2025 0757  Last data filed at 5/31/2025 0521  Gross per 24 hour   Intake 1299 ml   Output 100 ml   Net 1199 ml       Physical Exam:        General:  Awake, alert and oriented. Appears to be not in any distress  Mucous Membranes:  Pink , anicteric  HEENT - MM clear. TM normal. Lips normal with no edema  No tongue edema   Uvula normal  No Submandibular LN palpable    Neck: No JVD, no carotid bruit, no thyromegaly  Chest:  Clear to auscultation bilaterally, no added sounds  Cardiovascular:  RRR S1S2 heard, no murmurs or gallops  Abdomen:  Soft, undistended, non tender, no organomegaly, BS present  Extremities: No edema or cyanosis. Distal pulses well felt  Neurological : grossly normal      Medications:   Scheduled Medications:    sodium chloride flush  5-40 mL IntraVENous 2 times per day    enoxaparin  40 mg SubCUTAneous Daily    ARIPiprazole  15 mg Oral Daily    gabapentin  300 mg Oral TID    lisinopril  10 mg Oral Daily    metoprolol succinate  100 mg Oral Daily     I   sodium chloride      lactated ringers 75 mL/hr at 05/31/25 0112     sodium chloride flush, sodium chloride, potassium chloride **OR** potassium alternative oral replacement **OR** potassium chloride, magnesium sulfate, ondansetron **OR** ondansetron, polyethylene glycol, acetaminophen

## 2025-05-31 NOTE — PROGRESS NOTES
Admitted from ER to -1 in stable condition.  Denies any CP or chest pressure.  VSS.  SB on telemetry.  Alert & oriented.  Admission completed.  Patient is able to demonstrate the ability to move from a reclining position to an upright position within the recliner.  Declines head to toe skin assessment & denies any skin issues.  Resting quietly in bed with call in easy reach.  Continue to monitor closely.  Meghan Menjivar RN

## 2025-05-31 NOTE — PROGRESS NOTES
Patient assessed and AM medications given.  Reports nausea improved since Compazine.  Held Metoprolol for HR 50's.  MD notified.  Denies any further needs at this time. Call light within reach.

## 2025-05-31 NOTE — ED NOTES
Milagro Lee is a 40 y.o. female admitted for  Active Problems:    * No active hospital problems. *  Resolved Problems:    * No resolved hospital problems. *  .   Patient Home via EMS transportation with   Chief Complaint   Patient presents with    Chest Pain     From home via EMS - 1 hr PTA chest heaviness - not pain said it just feels weird. Ate sausage for dinner and then became diaphoretic and very nauseous. Has vomited a lot, 8mg zofran given enroute.    .  Patient is alert and Person, Place, Time, and Situation  Patient's baseline mobility: Baseline Mobility: Independent   Code Status: Prior   Cardiac Rhythm:       Is patient on baseline Oxygen: no how many Liters NA :   Abnormal Assessment Findings: + for fentanyl, pt denies using. Pt is nodding off, says it is due to being tired, provider aware. Bps soft, normally has HTN. Hx CHF per pt but never followed up.     Isolation: None      NIH Score:    C-SSRS: Risk of Suicide: No Risk  Bedside swallow:        Active LDA's: 20g in LAC  Peripheral IV 05/30/25 Left Antecubital (Active)     Patient admitted with a virgen: no   Patient admitted with Central Line:  NA .      Family/Caregiver Present yes Any Concerns: no   Restraints no  Sitter no         Vitals: MEWS Score: 1    Vitals:    05/30/25 2320 05/30/25 2330 05/30/25 2345 05/31/25 0000   BP: 112/65 105/66 101/62 105/67   Pulse: 64 61 58 51   Resp: 12 22 17 18   Temp:       TempSrc:       SpO2: 94% 99% 98% 100%   Weight:       Height:           Last documented pain score (0-10 scale) Pain Level: 7  Pain medication administered Yes- see MAR.    Pertinent or High Risk Medications/Drips: No.    Pending Blood Product Administration: no    Abnormal labs:   Abnormal Labs Reviewed   CBC WITH AUTO DIFFERENTIAL - Abnormal; Notable for the following components:       Result Value    WBC 15.4 (*)     RBC 5.88 (*)     Hemoglobin 17.4 (*)     Hematocrit 52.7 (*)     Neutrophils Absolute 11.9 (*)     All other components

## 2025-05-31 NOTE — H&P
Hospital Medicine History & Physical      Date of Admission: 5/30/2025    Date of Service:  Pt seen/examined on 5/31/2025    [x]Admitted to Inpatient with expected LOS greater than two midnights due to medical therapy.  []Placed in Observation status.    Chief Admission Complaint: Chest pressure    Presenting Admission History:      40 y.o. female who presented to [facility] with chest pressure.  PMHx significant for, hyperlipidemia, hypertension, restless leg, bipolar disease, asthma.      Noted that her chest pain started after she ate sauerkraut and noted that her lips became swollen, she became diaphoretic, nauseous and had vomiting and diarrhea and was concerned that she had allergic reaction so she came to the emergency department.  Had similar reaction when she took Bactrim in the past.  Patient reports that her symptoms actually started improving on the way from EMS to emergency department.  She did not have any recurrence of symptoms since being in the emergency department.  Overall, felt much better.  Denies any shortness of breath.  Denies any current chest pain.  Denies any fevers or chills.  No other acute complaints.    She noted that she does have a history of prior illicit substance use but says that she has not used for a while now.  She is on Suboxone.    In the ED, patient noted to be bradycardic, blood pressure noted to be on the softer side and on room air.  BMP largely unremarkable.  Troponin initially 14 and then went up at 43.  proBNP elevated to 62.  UDS positive for cannabis, fentanyl.  CBC showed WBC 15.4, hemoglobin 17.4.    She was given Benadryl and dicyclomine in the emergency department.    Assessment/Plan:      Current Principal Problem:  Chest pressure    #Chest pressure: Secondary to allergic reaction  #Sinus bradycardia  - Troponin was elevated at 43, will trend until peaked.  Likely related to type II in the setting of hypotension from anaphylactic reaction  - Patient does  tablet Take 1 tablet by mouth daily   Yes Zi Romano MD   gabapentin (NEURONTIN) 300 MG capsule Take 1 capsule by mouth 3 times daily.   Yes Zi Romano MD   lisinopril (PRINIVIL;ZESTRIL) 10 MG tablet Take 1 tablet by mouth daily   Yes Zi Romano MD   metoprolol succinate (TOPROL XL) 100 MG extended release tablet metoprolol succinate  mg tablet,extended release 24 hr   TAKE ONE TABLET BY MOUTH DAILY   Yes Zi Romano MD   ibuprofen (ADVIL;MOTRIN) 600 MG tablet Take 1 tablet by mouth every 6 hours as needed for Pain   Yes Zi Romano MD   valACYclovir (VALTREX) 500 MG tablet Take 1 tablet by mouth daily  Patient not taking: Reported on 5/30/2025    Zi Romano MD   ondansetron (ZOFRAN-ODT) 4 MG disintegrating tablet Take 1 tablet by mouth 3 times daily as needed for Nausea or Vomiting  Patient not taking: Reported on 5/30/2025 4/6/24   Sofi Pablo MD   minocycline (DYNACIN) 100 MG tablet Take 1 tablet by mouth 2 times daily  Patient not taking: Reported on 5/30/2025    Zi Romano MD   pramipexole (MIRAPEX) 0.125 MG tablet Take 1 tablet by mouth 3 times daily  Patient not taking: Reported on 5/30/2025    Zi Romano MD   potassium chloride (KLOR-CON M) 20 MEQ extended release tablet Take 1 tablet by mouth daily for 10 days 2/13/18 2/23/18  Gigi Dodd MD   naproxen (NAPROSYN) 500 MG tablet Take 1 tablet by mouth 2 times daily for 5 days 4/27/17 5/2/17  Adriana Basurto, APRN - CNP   IUD'S IU 1 Device by Intrauterine route  Patient not taking: Reported on 5/30/2025    Zi Romano MD       Labs: Personally reviewed and interpreted for clinical significance.   Recent Labs     05/30/25 2033   WBC 15.4*   HGB 17.4*   HCT 52.7*        Recent Labs     05/30/25 2033      K 3.5      CO2 22   BUN 14   CREATININE 0.8   CALCIUM 8.2*   MG 1.91     Recent Labs     05/30/25 2033 05/30/25 2156

## 2025-05-31 NOTE — ED PROVIDER NOTES
Attending Supervisory Note/Shared Visit   I have personally performed a face to face diagnostic evaluation on this patient. I have reviewed the mid-level’s findings and agree.  History and Exam by me shows well-appearing female no acute distress.  Patient presented ED for evaluation of chest discomfort that she describes a heaviness.  Also report associated shortness of breath.  States that this occurred after eating and she felt diaphoretic and nauseous.  She does have a history of bradycardia and has seen a cardiologist in the past.  She reports a week of lower extremity edema.  She denies pain to the lower extremities.  She has a history of hypertension hyperlipidemia and is a smoker.  States that she has not followed up with cardiology.     On presentation patient's vital signs within normal limits though she was notably bradycardic.  Lungs could auscultation and she has a regular rate and rhythm.  She does have 1+ edema to the lower extremities bilaterally.     Patient was initially seen by advanced practice provider.  Initial troponin within normal limits.  Repeat was slightly elevated.  Patient does have multiple risk factors for CAD, does have some signs concerning for possible CHF or fluid overload.  Admission discussed with hospitalist.  Patient was admitted in stable condition.     EKG demonstrates sinus rhythm ventricular rate of 56 bpm.  WA interval is prolonged QTc intervals within normal limits.  Patient has normal axis.  There are no significant ST elevations or depressions EKG is nondiagnostic for ACS as interpreted by me..  Compared to EKG from 9/23/2024  Patient is taken change.           Disposition:  1. Atypical chest pain    2. Nausea vomiting and diarrhea    3. Elevated troponin          Brian Villalobos MD  Attending Emergency Physician        Brian Villalobos MD  06/03/25 0116

## 2025-05-31 NOTE — ED NOTES
Pt sitting up in bed eyes open, AAOx4, NAD, resp e/u on RA, bed locked lowest position, rails up x2, call light within reach, pt on BP cuff Pulse ox and Cardiac monitor, asking for a cup of ice water, will check orders and diet status, pt denies any other needs or complaints at this time, denies pain at this time.

## 2025-05-31 NOTE — ED PROVIDER NOTES
I did not personally evaluate this patient but I was asked to review the EKG.     EKG  The Ekg interpreted by myself in the emergency department in the absence of a cardiologist.  sinus bradycardia, rate=56  with a rate of 56  Axis is   Normal  QTc is  within an acceptable range  Intervals and Durations are unremarkable.      No specific ST-T wave changes appreciated.  No evidence of acute ischemia.   No significant change from prior EKG dated September 23, 2024     Feliz Guadarrama MD  05/30/25 2017

## 2025-05-31 NOTE — ED PROVIDER NOTES
[facility] EMERGENCY DEPARTMENT  EMERGENCY DEPARTMENT ENCOUNTER        Pt Name: Milagro Lee  MRN: [id number]  Birthdate 1985  Date of evaluation: 5/30/2025  Provider: SAMIA Mejia  PCP: Kat Chavez MD  Note Started: 8:09 PM EDT 5/30/25       I have seen and evaluated this patient with my supervising physician Brian Villalobos MD.      CHIEF COMPLAINT       Chief Complaint   Patient presents with    Chest Pain     From home via EMS - 1 hr PTA chest heaviness - not pain said it just feels weird. Ate sausage for dinner and then became diaphoretic and very nauseous. Has vomited a lot, 8mg zofran given enroute.        HISTORY OF PRESENT ILLNESS: 1 or more Elements     History From: Patient    Limitations to history : None    Social Determinants Significantly Affecting Health : None    Chief Complaint: Chest pain    Milagro Lee is a 40 y.o. female who presents to the emergency department via EMS for chest pain.  States that around 1 hour prior to arrival she started feeling a pressure sensation in her chest after she ate sauerkraut.  Also states that she felt like her lips became swollen she became diaphoretic, nauseous, vomiting, and had diarrhea.  States that she was concerned she was having allergic reaction.  When EMS got there they did give her 8 mg of Zofran.  She states that that did help with the nausea.  She reports that the symptoms have since resolved.  She states that she had a similar reaction when she took Bactrim in the past.  She denies fevers, chills, recent illness.    Nursing Notes were all reviewed and agreed with or any disagreements were addressed in the HPI.    REVIEW OF SYSTEMS :      Review of Systems    Positives and Pertinent negatives as per HPI.     SURGICAL HISTORY     Past Surgical History:   Procedure Laterality Date    BLADDER SURGERY      CYSTOSCOPY Left 08/10/2016    , left stone manipulation and stent placement 6 x 24    HYSTERECTOMY (CERVIX STATUS

## 2025-06-01 VITALS
SYSTOLIC BLOOD PRESSURE: 125 MMHG | HEART RATE: 62 BPM | OXYGEN SATURATION: 100 % | TEMPERATURE: 97.8 F | RESPIRATION RATE: 18 BRPM | BODY MASS INDEX: 31.78 KG/M2 | DIASTOLIC BLOOD PRESSURE: 67 MMHG | WEIGHT: 222 LBS | HEIGHT: 70 IN

## 2025-06-01 PROBLEM — K52.9 COLITIS: Status: ACTIVE | Noted: 2025-06-01

## 2025-06-01 LAB
BASOPHILS # BLD: 0 K/UL (ref 0–0.2)
BASOPHILS NFR BLD: 0.1 %
DEPRECATED RDW RBC AUTO: 12.5 % (ref 12.4–15.4)
EOSINOPHIL # BLD: 0 K/UL (ref 0–0.6)
EOSINOPHIL NFR BLD: 0.2 %
HCT VFR BLD AUTO: 39.6 % (ref 36–48)
HGB BLD-MCNC: 13.5 G/DL (ref 12–16)
LYMPHOCYTES # BLD: 1.2 K/UL (ref 1–5.1)
LYMPHOCYTES NFR BLD: 12.6 %
MCH RBC QN AUTO: 30 PG (ref 26–34)
MCHC RBC AUTO-ENTMCNC: 34.1 G/DL (ref 31–36)
MCV RBC AUTO: 88 FL (ref 80–100)
MONOCYTES # BLD: 0.8 K/UL (ref 0–1.3)
MONOCYTES NFR BLD: 8 %
NEUTROPHILS # BLD: 7.5 K/UL (ref 1.7–7.7)
NEUTROPHILS NFR BLD: 79.1 %
PLATELET # BLD AUTO: 170 K/UL (ref 135–450)
PMV BLD AUTO: 8.8 FL (ref 5–10.5)
RBC # BLD AUTO: 4.5 M/UL (ref 4–5.2)
WBC # BLD AUTO: 9.5 K/UL (ref 4–11)

## 2025-06-01 PROCEDURE — 96365 THER/PROPH/DIAG IV INF INIT: CPT

## 2025-06-01 PROCEDURE — 96376 TX/PRO/DX INJ SAME DRUG ADON: CPT

## 2025-06-01 PROCEDURE — 85025 COMPLETE CBC W/AUTO DIFF WBC: CPT

## 2025-06-01 PROCEDURE — 2500000003 HC RX 250 WO HCPCS: Performed by: INTERNAL MEDICINE

## 2025-06-01 PROCEDURE — G0378 HOSPITAL OBSERVATION PER HR: HCPCS

## 2025-06-01 PROCEDURE — 6370000000 HC RX 637 (ALT 250 FOR IP): Performed by: STUDENT IN AN ORGANIZED HEALTH CARE EDUCATION/TRAINING PROGRAM

## 2025-06-01 PROCEDURE — 6370000000 HC RX 637 (ALT 250 FOR IP): Performed by: INTERNAL MEDICINE

## 2025-06-01 PROCEDURE — 2580000003 HC RX 258: Performed by: INTERNAL MEDICINE

## 2025-06-01 PROCEDURE — 96361 HYDRATE IV INFUSION ADD-ON: CPT

## 2025-06-01 PROCEDURE — 36415 COLL VENOUS BLD VENIPUNCTURE: CPT

## 2025-06-01 PROCEDURE — 6360000002 HC RX W HCPCS: Performed by: INTERNAL MEDICINE

## 2025-06-01 PROCEDURE — 99238 HOSP IP/OBS DSCHRG MGMT 30/<: CPT | Performed by: INTERNAL MEDICINE

## 2025-06-01 RX ORDER — CIPROFLOXACIN 500 MG/1
500 TABLET, FILM COATED ORAL 2 TIMES DAILY
Qty: 14 TABLET | Refills: 0 | Status: SHIPPED | OUTPATIENT
Start: 2025-06-01 | End: 2025-06-08

## 2025-06-01 RX ORDER — METRONIDAZOLE 500 MG/1
500 TABLET ORAL 3 TIMES DAILY
Qty: 21 TABLET | Refills: 0 | Status: SHIPPED | OUTPATIENT
Start: 2025-06-01 | End: 2025-06-08

## 2025-06-01 RX ADMIN — ARIPIPRAZOLE 15 MG: 10 TABLET ORAL at 07:41

## 2025-06-01 RX ADMIN — BUPRENORPHINE HYDROCHLORIDE AND NALOXONE HYDROCHLORIDE DIHYDRATE 2 TABLET: 8; 2 TABLET SUBLINGUAL at 07:41

## 2025-06-01 RX ADMIN — GABAPENTIN 300 MG: 300 CAPSULE ORAL at 07:41

## 2025-06-01 RX ADMIN — WATER 40 MG: 1 INJECTION INTRAMUSCULAR; INTRAVENOUS; SUBCUTANEOUS at 07:41

## 2025-06-01 RX ADMIN — PIPERACILLIN AND TAZOBACTAM 3375 MG: 3; .375 INJECTION, POWDER, LYOPHILIZED, FOR SOLUTION INTRAVENOUS at 07:54

## 2025-06-01 RX ADMIN — FAMOTIDINE 20 MG: 20 TABLET, FILM COATED ORAL at 07:41

## 2025-06-01 NOTE — PLAN OF CARE
Problem: Discharge Planning  Goal: Discharge to home or other facility with appropriate resources  6/1/2025 1158 by Miky Lozoya RN  Outcome: Adequate for Discharge  6/1/2025 0829 by Miky Lozoya RN  Outcome: Progressing  5/31/2025 2229 by Meghan Menjivar, RN  Outcome: Progressing     Problem: Safety - Adult  Goal: Free from fall injury  6/1/2025 1158 by Miky Lozoya RN  Outcome: Adequate for Discharge  6/1/2025 0829 by Miky Lozoya RN  Outcome: Progressing  5/31/2025 2229 by Meghan Menjivar, RN  Outcome: Progressing

## 2025-06-01 NOTE — FLOWSHEET NOTE
06/01/25 0127   Vital Signs   Temp 97.6 °F (36.4 °C)   Temp Source Oral   Pulse 50   Heart Rate Source Monitor   Respirations 16   /63   MAP (Calculated) 77   Oxygen Therapy   SpO2 98 %   O2 Device None (Room air)     Resting quietly with respirations easy/even on RA.  No c/o.  Call in easy reach.  Continue to monitor closely.  Meghan Menjivar RN

## 2025-06-01 NOTE — PROGRESS NOTES
Patient assessed and AM medications given.  New PIV.  Patient tolerated well. No blood in stool overnight.  Denies any further needs at this time.  Call light within reach.

## 2025-06-01 NOTE — DISCHARGE SUMMARY
Name:  Milagro Lee  Room:  /0313-01  MRN:    [id number]    IM Discharge Summary    Discharging Physician:  Adair eubanks MD    Admit: 5/30/2025    Discharge:      PCP      Kat Chavez MD    Diagnoses and hospital course  this Admission         #Chest pressure/lip swelling  Suspected sec to allergic reaction for sauerkraut per pt   - quickly improved with benadryl in ER      - Troponin was elevated at 43,  improved on repeat   Likely related to type II in the setting of hypotension from anaphylactic reaction  - Patient does note a history of heart failure in the past but says she has never followed up with it.    - Monitored overnight and has no signs of recurrence of allergic reaction can likely discharge today with prednisone and pepcid  - I also recommended to stop lisinopril         Acute colitis - suspect bacterial infection vs ischemic  with mild Rectal bleed   GI consulted and plans for outpt colonoscopy  Pain controlled,   Repeat hb stable . Given zosyn here , will dc with cipro and flagyl     # Hypertension  # Hyperlipidemia  - resume home BP meds - continue metoprolol , stop ACEI     # Bipolar disease:  - Resume home Abilify     #Asthma- stable    # hx of narcotic abuse - on suboxone , resumed            HPI      40 y.o. female who presented to [facility] with chest pressure.  PMHx significant for, hyperlipidemia, hypertension, restless leg, bipolar disease, asthma.       Noted that her chest pain started after she ate sauerkraut and noted that her lips became swollen, she became diaphoretic, nauseous and had vomiting and diarrhea and was concerned that she had allergic reaction so she came to the emergency department.  Had similar reaction when she took Bactrim in the past.  Patient reports that her symptoms actually started improving on the way from EMS to emergency department.  She did not have any recurrence of symptoms since being in the emergency department.  Overall, felt much better.   05/30/25 2033 05/31/25  0454 06/01/25  0740   WBC 15.4* 11.5* 9.5   HGB 17.4* 14.3 13.5   HCT 52.7* 43.4 39.6   MCV 89.6 90.0 88.0    207 170       Recent Labs     05/30/25 2033 05/31/25  0454    138   K 3.5 4.4    107   CO2 22 18*   BUN 14 14   CREATININE 0.8 0.7       CBC:  Lab Results   Component Value Date/Time    WBC 9.5 06/01/2025 07:40 AM    HGB 13.5 06/01/2025 07:40 AM    HCT 39.6 06/01/2025 07:40 AM    MCV 88.0 06/01/2025 07:40 AM     06/01/2025 07:40 AM    NEUTOPHILPCT 79.1 06/01/2025 07:40 AM    LYMPHOPCT 12.6 06/01/2025 07:40 AM    MONOPCT 8.0 06/01/2025 07:40 AM    EOSPCT 0.2 06/01/2025 07:40 AM    BASOPCT 0.1 06/01/2025 07:40 AM    NEUTROABS 7.5 06/01/2025 07:40 AM    LYMPHSABS 1.2 06/01/2025 07:40 AM    MONOSABS 0.8 06/01/2025 07:40 AM    EOSABS 0.0 06/01/2025 07:40 AM    BASOSABS 0.0 06/01/2025 07:40 AM     BNP:   Lab Results   Component Value Date/Time     05/31/2025 04:54 AM    K 4.4 05/31/2025 04:54 AM    CO2 18 05/31/2025 04:54 AM    BUN 14 05/31/2025 04:54 AM    CREATININE 0.7 05/31/2025 04:54 AM    CALCIUM 8.1 05/31/2025 04:54 AM                Medication List        START taking these medications      ciprofloxacin 500 MG tablet  Commonly known as: Cipro  Take 1 tablet by mouth 2 times daily for 7 days     metroNIDAZOLE 500 MG tablet  Commonly known as: FLAGYL  Take 1 tablet by mouth 3 times daily for 7 days            CONTINUE taking these medications      ARIPiprazole 15 MG tablet  Commonly known as: ABILIFY     gabapentin 300 MG capsule  Commonly known as: NEURONTIN     IUD'S IU     metoprolol succinate 100 MG extended release tablet  Commonly known as: TOPROL XL            STOP taking these medications      ibuprofen 600 MG tablet  Commonly known as: ADVIL;MOTRIN     lisinopril 10 MG tablet  Commonly known as: PRINIVIL;ZESTRIL     minocycline 100 MG tablet  Commonly known as: DYNACIN     naproxen 500 MG tablet  Commonly known as: Naprosyn     ondansetron 4

## 2025-06-01 NOTE — PLAN OF CARE
Problem: Discharge Planning  Goal: Discharge to home or other facility with appropriate resources  6/1/2025 0829 by Miky Lozoya, RN  Outcome: Progressing  5/31/2025 2229 by Meghan Menjivar, RN  Outcome: Progressing     Problem: Safety - Adult  Goal: Free from fall injury  6/1/2025 0829 by Miky Lozoya, RN  Outcome: Progressing  5/31/2025 2229 by Meghan Menjivar, RN  Outcome: Progressing

## 2025-06-01 NOTE — CONSULTS
Gastroenterology Consult Note    Patient:   Milagro Lee   :    1985   [facility]     [facility]  [address] [place] [address]   Referring/PCP: Kat Chavez MD  Date:     2025  Consultant:   Cyrus Kelly MD    Subjective:     40-year-old female with history of hypertension, hyperlipidemia, restless leg syndrome, bipolar disorder presented with chest pressure and possible signs of an allergic reaction.  GI is consulted due to hematochezia and evidence of colitis on CT.    The patient reported presenting with chest pressure and tongue swelling after eating some sauerkraut on .  She also reported having crampy bilateral lower abdominal pain that same night.  She proceeded to have 4 episodes of rayo hematochezia the early morning of  per her account.  She denies any diarrhea or bowel movement since then.  She now reports some mild left-sided abdominal pain and tenderness.  She denies any fever.  She does report that she had a low blood pressure on presentation.  Leukocytosis and hemoconcentration on presentation, labs otherwise mostly unremarkable.  CT of the abdomen pelvis demonstrated signs of left-sided colitis in descending and sigmoid.  She denies smoking.  She denies any prior history of colitis.  She was able to tolerate solid food with no issues today.    Past Medical History:   Diagnosis Date    Anxiety     Hepatitis C antibody positive in blood 2017    Hyperlipidemia     Hypertension     Kidney stone     Migraine     Migraine     MRSA (methicillin resistant staph aureus) culture positive 3/1/11    sputum     Past Surgical History:   Procedure Laterality Date    BLADDER SURGERY      CYSTOSCOPY Left 08/10/2016    , left stone manipulation and stent placement 6 x 24    HYSTERECTOMY (CERVIX STATUS UNKNOWN)         Social:   Social History     Tobacco Use    Smoking status: Every Day     Current packs/day: 0.00     Types:

## 2025-06-01 NOTE — CARE COORDINATION
Discharge Planning:     (CM) reviewed the patient's chart to assess needs. Patient's Readmission Risk Score is 8%. Patient's medical insurance is commercial. Patient's PCP is Kat Chavez MD. No needs anticipated, at this time. CM team to follow. Staff to inform CM if additional discharge needs arise.

## 2025-06-16 ENCOUNTER — TELEPHONE (OUTPATIENT)
Dept: CARDIOLOGY CLINIC | Age: 40
End: 2025-06-16

## 2025-06-16 NOTE — TELEPHONE ENCOUNTER
Juani from [facility] called needing pt scheduled  Referral is scanned into Media  Please call pt and schedule

## 2025-06-17 NOTE — TELEPHONE ENCOUNTER
6/17 Called [phone]. [facility] for pt jonathan interested in scheduling appt with [facility]

## 2025-06-30 PROBLEM — R79.89 ELEVATED TROPONIN: Status: RESOLVED | Noted: 2025-05-31 | Resolved: 2025-06-30

## 2025-07-21 ENCOUNTER — TRANSCRIBE ORDERS (OUTPATIENT)
Dept: ADMINISTRATIVE | Age: 40
End: 2025-07-21

## 2025-07-21 ENCOUNTER — HOSPITAL ENCOUNTER (OUTPATIENT)
Dept: LAB | Age: 40
Discharge: HOME | End: 2025-07-21
Payer: COMMERCIAL

## 2025-07-21 DIAGNOSIS — B18.2 CHRONIC HEPATITIS C WITHOUT HEPATIC COMA (HCC): Primary | ICD-10-CM

## 2025-07-21 LAB
INR PPP: 0.97 (ref 0.86–1.14)
PROTHROMBIN TIME: 13.2 SEC (ref 12.1–14.9)

## 2025-07-21 PROCEDURE — 87902 NFCT AGT GNTYP ALYS HEP C: CPT

## 2025-07-21 PROCEDURE — 85610 PROTHROMBIN TIME: CPT

## 2025-07-21 PROCEDURE — 85025 COMPLETE CBC W/AUTO DIFF WBC: CPT

## 2025-07-21 PROCEDURE — 87522 HEPATITIS C REVRS TRNSCRPJ: CPT

## 2025-07-21 PROCEDURE — 80053 COMPREHEN METABOLIC PANEL: CPT

## 2025-07-21 PROCEDURE — 80074 ACUTE HEPATITIS PANEL: CPT

## 2025-07-21 PROCEDURE — 36415 COLL VENOUS BLD VENIPUNCTURE: CPT

## 2025-07-22 LAB
ALBUMIN SERPL-MCNC: 3.8 G/DL (ref 3.4–5)
ALBUMIN/GLOB SERPL: 1.4 {RATIO} (ref 1.1–2.2)
ALP SERPL-CCNC: 93 U/L (ref 40–129)
ALT SERPL-CCNC: 30 U/L (ref 10–40)
ANION GAP SERPL CALCULATED.3IONS-SCNC: 11 MMOL/L (ref 3–16)
AST SERPL-CCNC: 27 U/L (ref 15–37)
BASOPHILS # BLD: 0 K/UL (ref 0–0.2)
BASOPHILS NFR BLD: 0.8 %
BILIRUB SERPL-MCNC: 0.3 MG/DL (ref 0–1)
BUN SERPL-MCNC: 11 MG/DL (ref 7–20)
CALCIUM SERPL-MCNC: 8.9 MG/DL (ref 8.3–10.6)
CHLORIDE SERPL-SCNC: 107 MMOL/L (ref 99–110)
CO2 SERPL-SCNC: 23 MMOL/L (ref 21–32)
CREAT SERPL-MCNC: 0.6 MG/DL (ref 0.6–1.1)
DEPRECATED RDW RBC AUTO: 13.1 % (ref 12.4–15.4)
EOSINOPHIL # BLD: 0.1 K/UL (ref 0–0.6)
EOSINOPHIL NFR BLD: 2.3 %
GFR SERPLBLD CREATININE-BSD FMLA CKD-EPI: >90 ML/MIN/{1.73_M2}
GLUCOSE SERPL-MCNC: 85 MG/DL (ref 70–99)
HAV IGM SERPL QL IA: ABNORMAL
HBV CORE IGM SERPL QL IA: ABNORMAL
HBV SURFACE AG SERPL QL IA: ABNORMAL
HCT VFR BLD AUTO: 36.7 % (ref 36–48)
HCV AB SERPL QL IA: REACTIVE
HGB BLD-MCNC: 12.3 G/DL (ref 12–16)
LYMPHOCYTES # BLD: 1.2 K/UL (ref 1–5.1)
LYMPHOCYTES NFR BLD: 23.2 %
MCH RBC QN AUTO: 29.6 PG (ref 26–34)
MCHC RBC AUTO-ENTMCNC: 33.6 G/DL (ref 31–36)
MCV RBC AUTO: 88.2 FL (ref 80–100)
MONOCYTES # BLD: 0.4 K/UL (ref 0–1.3)
MONOCYTES NFR BLD: 7.9 %
NEUTROPHILS # BLD: 3.5 K/UL (ref 1.7–7.7)
NEUTROPHILS NFR BLD: 65.8 %
PLATELET # BLD AUTO: 239 K/UL (ref 135–450)
PMV BLD AUTO: 9.4 FL (ref 5–10.5)
POTASSIUM SERPL-SCNC: 4.3 MMOL/L (ref 3.5–5.1)
PROT SERPL-MCNC: 6.6 G/DL (ref 6.4–8.2)
RBC # BLD AUTO: 4.16 M/UL (ref 4–5.2)
SODIUM SERPL-SCNC: 141 MMOL/L (ref 136–145)
WBC # BLD AUTO: 5.3 K/UL (ref 4–11)

## 2025-07-23 LAB
HCV RNA SERPL NAA+PROBE-ACNC: ABNORMAL IU/ML
HCV RNA SERPL NAA+PROBE-LOG IU: 7.32 LOG IU/ML
HCV RNA SERPL QL NAA+PROBE: DETECTED

## 2025-07-24 LAB — HCV GENTYP SERPL NAA+PROBE: NORMAL

## 2025-07-29 NOTE — PROGRESS NOTES
Physician Progress Note      PATIENT:               BRENDA EPPS  [facility] #:                  [id number]  :                       1985  ADMIT DATE:       2025 8:07 PM  DISCH DATE:        2025 12:51 PM  RESPONDING  PROVIDER #:        Adair Moulton MD          QUERY TEXT:    Type II is documented in the H&P on 25.  Please specify the type of MI:    The clinical indicators include:  39yo female with history of Anxiety, Hepatitis C antibody positive in blood   (2017), Hyperlipidemia, Hypertension, Kidney stone, Migraine, Migraine,   and MRSA  culture positive (3/1/11).    VS- -98, 45, 25, 122/91 - 85/50  Trop 14, 43....UDS- + Cannibus and Fentanyl,  Per H&P on 25-Chest pressure: Secondary to allergic reaction.  #Sinus   bradycardia  - Troponin was elevated at 43, will trend until peaked.  Likely related to   type II in the setting of hypotension from anaphylactic reaction    NS 1000ml iv bolus, Trop, Telemetry  Options provided:  -- Type 2 MI (related to demand ischemia or secondary to ischemic imbalance)   related to hypotension from anaphylactic reaction  -- Type 2 MI (related to demand ischemia or secondary to ischemic imbalance)   related to, Please specify cause.  -- Other - I will add my own diagnosis  -- Disagree - Not applicable / Not valid  -- Disagree - Clinically unable to determine / Unknown  -- Refer to Clinical Documentation Reviewer    PROVIDER RESPONSE TEXT:    The patient has a Type 2 MI related to    Query created by: Hortensia Hendrix on 2025 6:18 AM      Electronically signed by:  Adair Moulton MD 2025 7:13 AM

## 2025-08-27 ENCOUNTER — HOSPITAL ENCOUNTER (OUTPATIENT)
Dept: ULTRASOUND IMAGING | Age: 40
Discharge: HOME | End: 2025-08-27
Payer: COMMERCIAL

## 2025-08-27 DIAGNOSIS — B18.2 CHRONIC HEPATITIS C WITHOUT HEPATIC COMA (HCC): ICD-10-CM

## 2025-08-27 PROCEDURE — 76981 USE PARENCHYMA: CPT
